# Patient Record
Sex: MALE | Race: BLACK OR AFRICAN AMERICAN | URBAN - METROPOLITAN AREA
[De-identification: names, ages, dates, MRNs, and addresses within clinical notes are randomized per-mention and may not be internally consistent; named-entity substitution may affect disease eponyms.]

---

## 2017-05-10 ENCOUNTER — EMERGENCY (EMERGENCY)
Facility: HOSPITAL | Age: 72
LOS: 0 days | Discharge: HOME | End: 2017-05-10
Attending: EMERGENCY MEDICINE
Payer: COMMERCIAL

## 2017-05-10 VITALS
RESPIRATION RATE: 16 BRPM | OXYGEN SATURATION: 100 % | WEIGHT: 139.99 LBS | SYSTOLIC BLOOD PRESSURE: 155 MMHG | TEMPERATURE: 98 F | DIASTOLIC BLOOD PRESSURE: 86 MMHG | HEART RATE: 66 BPM | HEIGHT: 67 IN

## 2017-05-10 DIAGNOSIS — I10 ESSENTIAL (PRIMARY) HYPERTENSION: ICD-10-CM

## 2017-05-10 DIAGNOSIS — R07.9 CHEST PAIN, UNSPECIFIED: ICD-10-CM
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
## 2017-05-10 DIAGNOSIS — S29.019A STRAIN OF MUSCLE AND TENDON OF UNSPECIFIED WALL OF THORAX, INITIAL ENCOUNTER: ICD-10-CM
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
## 2017-05-10 PROCEDURE — 99283 EMERGENCY DEPT VISIT LOW MDM: CPT

## 2017-05-10 NOTE — ED PROVIDER NOTE - MUSCULOSKELETAL, MLM
Spine appears normal, range of motion is not limited, no muscle or joint tenderness, left lateral chest wall mild tenderness
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
## 2021-06-07 PROBLEM — I10 ESSENTIAL (PRIMARY) HYPERTENSION: Chronic | Status: ACTIVE | Noted: 2017-05-10

## 2021-06-11 ENCOUNTER — NON-APPOINTMENT (OUTPATIENT)
Age: 76
End: 2021-06-11

## 2021-06-11 ENCOUNTER — APPOINTMENT (OUTPATIENT)
Dept: GERIATRICS | Facility: CLINIC | Age: 76
End: 2021-06-11
Payer: MEDICARE

## 2021-06-11 VITALS
WEIGHT: 165.38 LBS | TEMPERATURE: 97.7 F | BODY MASS INDEX: 25.96 KG/M2 | HEIGHT: 67 IN | HEART RATE: 61 BPM | SYSTOLIC BLOOD PRESSURE: 130 MMHG | OXYGEN SATURATION: 98 % | DIASTOLIC BLOOD PRESSURE: 72 MMHG | RESPIRATION RATE: 16 BRPM

## 2021-06-11 DIAGNOSIS — Z60.2 PROBLEMS RELATED TO LIVING ALONE: ICD-10-CM

## 2021-06-11 DIAGNOSIS — Z87.891 PERSONAL HISTORY OF NICOTINE DEPENDENCE: ICD-10-CM

## 2021-06-11 DIAGNOSIS — Z85.46 PERSONAL HISTORY OF MALIGNANT NEOPLASM OF PROSTATE: ICD-10-CM

## 2021-06-11 DIAGNOSIS — Z81.8 FAMILY HISTORY OF OTHER MENTAL AND BEHAVIORAL DISORDERS: ICD-10-CM

## 2021-06-11 DIAGNOSIS — Z00.00 ENCOUNTER FOR GENERAL ADULT MEDICAL EXAMINATION W/OUT ABNORMAL FINDINGS: ICD-10-CM

## 2021-06-11 DIAGNOSIS — Z78.9 OTHER SPECIFIED HEALTH STATUS: ICD-10-CM

## 2021-06-11 DIAGNOSIS — Z87.19 PERSONAL HISTORY OF OTHER DISEASES OF THE DIGESTIVE SYSTEM: ICD-10-CM

## 2021-06-11 DIAGNOSIS — N18.31 CHRONIC KIDNEY DISEASE, STAGE 3A: ICD-10-CM

## 2021-06-11 PROCEDURE — 99205 OFFICE O/P NEW HI 60 MIN: CPT

## 2021-06-11 RX ADMIN — ASPIRIN 0 MG: 81 TABLET, COATED ORAL at 00:00

## 2021-06-11 RX ADMIN — SIMVASTATIN 0 MG: 20 TABLET, FILM COATED ORAL at 00:00

## 2021-06-11 RX ADMIN — BISOPROLOL FUMARATE AND HYDROCHLOROTHIAZIDE 0 MG: 5; 6.25 TABLET, FILM COATED ORAL at 00:00

## 2021-06-11 RX ADMIN — AMLODIPINE BESYLATE 0 MG: 10 TABLET ORAL at 00:00

## 2021-06-11 SDOH — SOCIAL STABILITY - SOCIAL INSECURITY: PROBLEMS RELATED TO LIVING ALONE: Z60.2

## 2021-06-11 NOTE — HISTORY OF PRESENT ILLNESS
[No falls in past year] : Patient reported no falls in the past year [Fully functional (bathing, dressing, toileting, transferring, walking, feeding)] : Fully functional (bathing, dressing, toileting, transferring, walking, feeding) [Smoke Detector] : smoke detector [Carbon Monoxide Detector] : carbon monoxide detector [Seat Belt] :  uses seat belt [Driving] : driving [0] : 1) Little interest or pleasure doing things: Not at all [1] : 2) Feeling down, depressed, or hopeless for several days [Designated Healthcare Proxy] : Designated healthcare proxy [Name: ___] : Health Care Proxy's Name: [unfilled]  [Relationship: ___] : Relationship: [unfilled] [Guns at Home] : no guns at home [de-identified] : Requires some assistance with finances which is done by his son.  patient uses pill box and able to manage medications.   [FreeTextEntry1] : Depressed mood attributed to missing his wife.

## 2021-06-11 NOTE — PHYSICAL EXAM
[General Appearance - Alert] : alert [General Appearance - In No Acute Distress] : in no acute distress [General Appearance - Well Nourished] : well nourished [General Appearance - Well Developed] : well developed [General Appearance - Well-Appearing] : healthy appearing [Sclera] : the sclera and conjunctiva were normal [PERRL With Normal Accommodation] : pupils were equal in size, round, and reactive to light [Extraocular Movements] : extraocular movements were intact [Normal Oral Mucosa] : normal oral mucosa [No Oral Pallor] : no oral pallor [Neck Appearance] : the appearance of the neck was normal [Neck Cervical Mass (___cm)] : no neck mass was observed [Jugular Venous Distention Increased] : there was no jugular-venous distention [Thyroid Nodule] : there were no palpable thyroid nodules [Respiration, Rhythm And Depth] : normal respiratory rhythm and effort [Exaggerated Use Of Accessory Muscles For Inspiration] : no accessory muscle use [Auscultation Breath Sounds / Voice Sounds] : lungs were clear to auscultation bilaterally [Heart Rate And Rhythm] : heart rate was normal and rhythm regular [Heart Sounds] : normal S1 and S2 [Murmurs] : no murmurs [Edema] : there was no peripheral edema [Veins - Varicosity Changes] : there were no varicosital changes [Bowel Sounds] : normal bowel sounds [Abdomen Soft] : soft [Abdomen Tenderness] : non-tender [] : no hepato-splenomegaly [No CVA Tenderness] : no ~M costovertebral angle tenderness [No Spinal Tenderness] : no spinal tenderness [Abnormal Walk] : normal gait [Musculoskeletal - Swelling] : no joint swelling seen [Skin Color & Pigmentation] : normal skin color and pigmentation [Skin Turgor] : normal skin turgor [No Focal Deficits] : no focal deficits [Oriented To Time, Place, And Person] : oriented to person, place, and time [Affect] : the affect was normal [Mood] : the mood was normal [MentalStatusTotal] : 18

## 2021-06-11 NOTE — CURRENT MEDS
[Medication Reconciliation Completed] : Medication reconciliation completed [Yes] : Reviewed medication list for presence of high-risk medications.

## 2021-06-11 NOTE — REASON FOR VISIT
[Initial Evaluation] : an initial evaluation [Family Member] : family member [FreeTextEntry1] : forgetfulness

## 2021-06-11 NOTE — END OF VISIT
[] : Fellow [FreeTextEntry3] : Patient seen and examined with Dr. Mathis. Agree with assessment and plan. Need to r/o other causes of memory decline exacerbation. It is noted that memory loss was always there. Need to r/o stroke, mets. Will f/u with MOCA test on next visit and discuss risk benefit of medications. \par  [Time Spent: ___ minutes] : I have spent [unfilled] minutes of time on the encounter.

## 2021-06-18 ENCOUNTER — APPOINTMENT (OUTPATIENT)
Dept: CT IMAGING | Facility: IMAGING CENTER | Age: 76
End: 2021-06-18
Payer: MEDICARE

## 2021-06-18 ENCOUNTER — OUTPATIENT (OUTPATIENT)
Dept: OUTPATIENT SERVICES | Facility: HOSPITAL | Age: 76
LOS: 1 days | End: 2021-06-18
Payer: MEDICARE

## 2021-06-18 ENCOUNTER — RESULT REVIEW (OUTPATIENT)
Age: 76
End: 2021-06-18

## 2021-06-18 DIAGNOSIS — F03.90 UNSPECIFIED DEMENTIA WITHOUT BEHAVIORAL DISTURBANCE: ICD-10-CM

## 2021-06-18 PROCEDURE — 70450 CT HEAD/BRAIN W/O DYE: CPT | Mod: 26

## 2021-06-18 PROCEDURE — 70450 CT HEAD/BRAIN W/O DYE: CPT

## 2021-07-28 ENCOUNTER — TRANSCRIPTION ENCOUNTER (OUTPATIENT)
Age: 76
End: 2021-07-28

## 2021-08-20 ENCOUNTER — APPOINTMENT (OUTPATIENT)
Dept: GERIATRICS | Facility: CLINIC | Age: 76
End: 2021-08-20
Payer: MEDICARE

## 2021-08-20 VITALS
WEIGHT: 162.38 LBS | SYSTOLIC BLOOD PRESSURE: 120 MMHG | HEIGHT: 67 IN | DIASTOLIC BLOOD PRESSURE: 70 MMHG | OXYGEN SATURATION: 97 % | RESPIRATION RATE: 14 BRPM | HEART RATE: 65 BPM | BODY MASS INDEX: 25.48 KG/M2

## 2021-08-20 DIAGNOSIS — Z71.89 OTHER SPECIFIED COUNSELING: ICD-10-CM

## 2021-08-20 PROCEDURE — 99483 ASSMT & CARE PLN PT COG IMP: CPT

## 2021-08-20 RX ORDER — AMLODIPINE BESYLATE 10 MG/1
10 TABLET ORAL
Qty: 90 | Refills: 2 | Status: ACTIVE | COMMUNITY
Start: 2021-06-11

## 2021-08-20 RX ORDER — BISOPROLOL FUMARATE AND HYDROCHLOROTHIAZIDE 5; 6.25 MG/1; MG/1
5-6.25 TABLET, FILM COATED ORAL DAILY
Qty: 90 | Refills: 0 | Status: ACTIVE | COMMUNITY
Start: 2021-06-11

## 2021-08-20 RX ORDER — SIMVASTATIN 20 MG/1
20 TABLET, FILM COATED ORAL
Qty: 90 | Refills: 3 | Status: ACTIVE | COMMUNITY
Start: 2021-06-11

## 2021-08-31 PROBLEM — Z71.89 ADVANCED CARE PLANNING/COUNSELING DISCUSSION: Status: ACTIVE | Noted: 2021-06-11

## 2021-08-31 NOTE — HISTORY OF PRESENT ILLNESS
[Patient is independent with] : bathing [Independent] : managing finances [1] : 2) Feeling down, depressed, or hopeless for several days (1) [Several Days (1)] : 7.) Trouble concentrating on things, such as reading a newspaper or watching television? Several days [Not at All (0)] : 8.) Moving or speaking so slowly that other people could have noticed, or the opposite, moving or speaking faster than usual? Not at all [Mild] : severity of depression is mild [Not at all] : How difficult have these problems made it for you to do your work, take care of things at home, or get along with people? 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 Accompanied by Son Nnamdi. He has known medical hx of HTN, DM, HLD, hx of prostate ca s/p prostatectomy ( 2000), quit smoking - 1986 . Patient was referred here for consultation on forgetfulness by PCP. He presents today for cognition evaluation. \par \par According to son, this has been slowly progressive over the past 1-2 years, aggravated after demise of patients wife during covid lock down. He has been noted to forget recent conversations, repeating same questions, forgetting to pay some bills. He lost wife in May 2020, has 7 kids, 13 grandchildren. He endorses period of sadness attributed to missing his wife but he still enjoys things like watching TV, he loves to travel around like he has always done with his wife but has not been able to because of covid . He had been active with [employer] activities prior to covid. lives by himself - has a nephew living in his basement.\par No acute complaints today. \par \par PCP - Maurizio Silva\par  \par Blood work last week; CBC, PSA, electrolytes WNL, - A1C - 7.1 , Cr - 1.2 , GFR - 54 \par Completed Covid vaccine series \par Will obtain records from PCP and fax here\par HCP signed today designating Son \par \par Bereavement- His wife passed 1 year ago suddenly\par Son stated there were memory concerns prior to his wife passing away\par He has been warming up to returning to [employer], and does participate\par Continues to drive, no accidents or incidents noted \par \par  [Smoke Detector] : no smoke detector [Carbon Monoxide Detector] : no carbon monoxide detector [Guns at Home] : no guns at home [Grab Bars] : no grab bars [Shower Chair] : no shower chair [Anti-Slip Measures] : no anti-slip measures [Driving Concerns] : not driving or driving without noted concerns [Wears Seat Belt] : does not wear seat belt [Wears Sunscreen] : does not wear sunscreen [[id number]] : 2 [[id number]] : 5 [AdvancecareDate] : 08/20/21

## 2021-09-16 ENCOUNTER — TRANSCRIPTION ENCOUNTER (OUTPATIENT)
Age: 76
End: 2021-09-16

## 2021-10-01 ENCOUNTER — APPOINTMENT (OUTPATIENT)
Dept: GERIATRICS | Facility: CLINIC | Age: 76
End: 2021-10-01
Payer: MEDICARE

## 2021-10-01 VITALS
SYSTOLIC BLOOD PRESSURE: 122 MMHG | HEART RATE: 57 BPM | HEIGHT: 67 IN | BODY MASS INDEX: 25.29 KG/M2 | WEIGHT: 161.13 LBS | RESPIRATION RATE: 16 BRPM | OXYGEN SATURATION: 99 % | TEMPERATURE: 97.3 F | DIASTOLIC BLOOD PRESSURE: 64 MMHG

## 2021-10-01 PROCEDURE — 99215 OFFICE O/P EST HI 40 MIN: CPT | Mod: GC

## 2021-10-01 NOTE — END OF VISIT
[Time Spent: ___ minutes] : I have spent [unfilled] minutes of time on the encounter. [] : Fellow [FreeTextEntry4] : Pt will return to clinic in one month [FreeTextEntry3] : Agree with Dr Geller's assessment and plan. Patient would still like alternative methods of group therapy before initiating lexapro. No SI.

## 2021-10-01 NOTE — HISTORY OF PRESENT ILLNESS
[PHQ-2 Positive] : PHQ-2 Positive [PHQ-9 Positive] : PHQ-9 Positive [Moderate] : Stage: Moderate [Stable] : Status: Stable [FreeTextEntry1] : Mr Coto is a 75 yo [facility] Male, former smoker (quit 1986) w/ PMH Of dementia, mild depression HTN, DM, HLD, Hx of prostate Cancer s/p prostatectomy (2000) p/w accompanied with Son for f/u visit for forgetfulness and depression. \par \par He reports that since the last visit he has been walking more. He is gardening. Feels that he slowly is becoming more active and engaging more with people. Mr. Coto admits that at times he starts to think about his wife who passed one year ago and thoughts trigger sadness. \par He denies having thoughts of wanting to hurt himself or having a plan to hurt himself. \par \par Mr. Coto has 3 grand kids and they keep him occupied. He takes kids to school in the morning and picks them up after school. \par He is able to drive and has not had any accidents. Reports no recent falls, episodes of getting lost. Mr. Coto lives alone. he cooks, cleans and does laundry. Walks with no devices. He enjoys walks. ALso helps neighbors with  type of tasks. \par \par PHQ during today visit was 5. \par \par Mr. Coto wants to continue on working to get more active and see how this helps with managing depression. \par \par Pt and son are aware that improving mood will optimize memory.\par \par \par

## 2022-02-25 ENCOUNTER — LABORATORY RESULT (OUTPATIENT)
Age: 77
End: 2022-02-25

## 2022-02-25 ENCOUNTER — APPOINTMENT (OUTPATIENT)
Dept: GERIATRICS | Facility: CLINIC | Age: 77
End: 2022-02-25
Payer: MEDICARE

## 2022-02-25 VITALS
TEMPERATURE: 98.3 F | RESPIRATION RATE: 15 BRPM | WEIGHT: 164 LBS | BODY MASS INDEX: 25.69 KG/M2 | DIASTOLIC BLOOD PRESSURE: 60 MMHG | HEART RATE: 59 BPM | SYSTOLIC BLOOD PRESSURE: 136 MMHG | OXYGEN SATURATION: 99 %

## 2022-02-25 DIAGNOSIS — I10 ESSENTIAL (PRIMARY) HYPERTENSION: ICD-10-CM

## 2022-02-25 DIAGNOSIS — Z13.31 ENCOUNTER FOR SCREENING FOR DEPRESSION: ICD-10-CM

## 2022-02-25 DIAGNOSIS — E55.9 VITAMIN D DEFICIENCY, UNSPECIFIED: ICD-10-CM

## 2022-02-25 DIAGNOSIS — D64.9 ANEMIA, UNSPECIFIED: ICD-10-CM

## 2022-02-25 DIAGNOSIS — E78.5 HYPERLIPIDEMIA, UNSPECIFIED: ICD-10-CM

## 2022-02-25 PROCEDURE — 99215 OFFICE O/P EST HI 40 MIN: CPT

## 2022-03-01 LAB
25(OH)D3 SERPL-MCNC: 43.4 NG/ML
CHOLEST SERPL-MCNC: 153 MG/DL
ESTIMATED AVERAGE GLUCOSE: 148 MG/DL
FOLATE SERPL-MCNC: 10.8 NG/ML
HBA1C MFR BLD HPLC: 6.8 %
HDLC SERPL-MCNC: 42 MG/DL
LDLC SERPL CALC-MCNC: 88 MG/DL
NONHDLC SERPL-MCNC: 110 MG/DL
TRIGL SERPL-MCNC: 112 MG/DL
TSH SERPL-ACNC: 2.37 UIU/ML
VIT B12 SERPL-MCNC: 384 PG/ML

## 2022-03-02 PROBLEM — E55.9 VITAMIN D INSUFFICIENCY: Status: ACTIVE | Noted: 2022-02-25

## 2022-03-02 PROBLEM — I10 HTN (HYPERTENSION): Status: ACTIVE | Noted: 2021-06-11

## 2022-03-02 PROBLEM — D64.9 ANEMIA: Status: ACTIVE | Noted: 2022-02-25

## 2022-03-02 PROBLEM — Z13.31 POSITIVE DEPRESSION SCREENING: Status: ACTIVE | Noted: 2021-08-31

## 2022-03-02 PROBLEM — E78.5 HLD (HYPERLIPIDEMIA): Status: ACTIVE | Noted: 2021-06-11

## 2022-03-02 RX ORDER — GLIPIZIDE 2.5 MG/1
2.5 TABLET, FILM COATED, EXTENDED RELEASE ORAL
Qty: 90 | Refills: 3 | Status: ACTIVE | COMMUNITY
Start: 2022-03-02

## 2022-03-02 NOTE — REVIEW OF SYSTEMS
[As Noted in HPI] : as noted in HPI [Confused] : confusion [Depression] : depression [Emotional Problems] : emotional problems [Negative] : Heme/Lymph

## 2022-03-02 NOTE — PHYSICAL EXAM
[Alert] : alert [No Acute Distress] : in no acute distress [Sclera] : the sclera and conjunctiva were normal [Normal Outer Ear/Nose] : the ears and nose were normal in appearance [Normal Appearance] : the appearance of the neck was normal [No Respiratory Distress] : no respiratory distress [Normal S1, S2] : normal S1 and S2 [Edema] : edema was not present [Bowel Sounds] : normal bowel sounds [Abdomen Soft] : soft [No Spinal Tenderness] : no spinal tenderness [Normal Gait] : normal gait [Involuntary Movements] : no involuntary movements were seen [Normal Color / Pigmentation] : normal skin color and pigmentation [No Focal Deficits] : no focal deficits [Normal Insight/Judgment] : insight and judgment were intact [MocaTotal] : 19 [FreeTextEntry1] : 2021 [MentalStatusTotal] : done 8/2021

## 2022-03-02 NOTE — END OF VISIT
[] : Fellow [Time Spent: ___ minutes] : I have spent [unfilled] minutes of time on the encounter. [FreeTextEntry4] : Pt will return to clinic in one month [FreeTextEntry3] : Agree with Dr Geller's assessment and plan. Patient would still like alternative methods of group therapy before initiating lexapro. No SI.

## 2022-03-02 NOTE — HISTORY OF PRESENT ILLNESS
[One fall no injury in past year] : Patient reported one fall in the past year without injury [Completely Independent] : Completely independent. 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 \par \par Patient reports he is doing well\par Son reports he is still tearful and remains isolated. \par \par (+) gardening, playing dominos, helping with grandchildren, spiritual\par (-) has accommodations for [employer] which is the best social outlet for him w/ arranged rides with his sister in law however refuses to go due to differences with his  and possibly avoidance of the "how are you doing without your wife" scenario. \par (-) does not go out unless absolutely needed \par (-) easily tearful about wife 2 years later \par (-) decreased appetite\par \par #DEPRESSION\par Last PHQ during visit was 5\par patient prepared to start medication, son at Visit in support\par denies SI, has a support system, smiles with certain discussions \par \par \par  [GDS] : 5 done on 10/1/2021

## 2022-03-02 NOTE — CURRENT MEDS
[Yes] : Reviewed medication list for presence of high-risk medications. [] : does not miss any medication doses

## 2022-03-22 ENCOUNTER — APPOINTMENT (OUTPATIENT)
Dept: GERIATRICS | Facility: CLINIC | Age: 77
End: 2022-03-22
Payer: MEDICARE

## 2022-03-22 PROCEDURE — 99442: CPT

## 2022-04-08 ENCOUNTER — APPOINTMENT (OUTPATIENT)
Dept: GERIATRICS | Facility: CLINIC | Age: 77
End: 2022-04-08
Payer: MEDICARE

## 2022-04-08 VITALS
BODY MASS INDEX: 25.62 KG/M2 | WEIGHT: 163.25 LBS | TEMPERATURE: 97.8 F | HEIGHT: 67 IN | DIASTOLIC BLOOD PRESSURE: 60 MMHG | OXYGEN SATURATION: 96 % | HEART RATE: 62 BPM | SYSTOLIC BLOOD PRESSURE: 122 MMHG | RESPIRATION RATE: 16 BRPM

## 2022-04-08 DIAGNOSIS — Z63.4 DISAPPEARANCE AND DEATH OF FAMILY MEMBER: ICD-10-CM

## 2022-04-08 DIAGNOSIS — E11.9 TYPE 2 DIABETES MELLITUS W/OUT COMPLICATIONS: ICD-10-CM

## 2022-04-08 PROCEDURE — 99215 OFFICE O/P EST HI 40 MIN: CPT

## 2022-04-08 RX ORDER — ESCITALOPRAM OXALATE 5 MG/1
5 TABLET ORAL DAILY
Qty: 30 | Refills: 1 | Status: DISCONTINUED | COMMUNITY
Start: 2022-03-01 | End: 2022-04-08

## 2022-04-08 SDOH — SOCIAL STABILITY - SOCIAL INSECURITY: DISSAPEARANCE AND DEATH OF FAMILY MEMBER: Z63.4
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
## 2022-04-08 NOTE — REVIEW OF SYSTEMS
[Confused] : confusion [As Noted in HPI] : as noted in HPI [Depression] : depression [Emotional Problems] : emotional problems [Negative] : Heme/Lymph

## 2022-04-08 NOTE — PHYSICAL EXAM
[Alert] : alert [Sclera] : the sclera and conjunctiva were normal [Normal Outer Ear/Nose] : the ears and nose were normal in appearance [Normal Appearance] : the appearance of the neck was normal [No Respiratory Distress] : no respiratory distress [Normal S1, S2] : normal S1 and S2 [Edema] : edema was not present [Bowel Sounds] : normal bowel sounds [Abdomen Soft] : soft [No Spinal Tenderness] : no spinal tenderness [Normal Gait] : normal gait [Involuntary Movements] : no involuntary movements were seen [Normal Color / Pigmentation] : normal skin color and pigmentation [No Focal Deficits] : no focal deficits [Normal Insight/Judgment] : insight and judgment were intact [MocaTotal] : 19 [FreeTextEntry1] : 2021 [MentalStatusTotal] : done 8/2021

## 2022-04-29 ENCOUNTER — APPOINTMENT (OUTPATIENT)
Dept: GERIATRICS | Facility: CLINIC | Age: 77
End: 2022-04-29
Payer: MEDICARE

## 2022-04-29 VITALS
SYSTOLIC BLOOD PRESSURE: 108 MMHG | OXYGEN SATURATION: 97 % | WEIGHT: 162 LBS | RESPIRATION RATE: 16 BRPM | BODY MASS INDEX: 25.37 KG/M2 | TEMPERATURE: 98 F | HEART RATE: 69 BPM | DIASTOLIC BLOOD PRESSURE: 56 MMHG

## 2022-04-29 DIAGNOSIS — F41.9 ANXIETY DISORDER, UNSPECIFIED: ICD-10-CM

## 2022-04-29 DIAGNOSIS — F32.A ANXIETY DISORDER, UNSPECIFIED: ICD-10-CM

## 2022-04-29 DIAGNOSIS — E87.1 HYPO-OSMOLALITY AND HYPONATREMIA: ICD-10-CM

## 2022-04-29 DIAGNOSIS — F03.90 UNSPECIFIED DEMENTIA W/OUT BEHAVIORAL DISTURBANCE: ICD-10-CM

## 2022-04-29 PROCEDURE — 99214 OFFICE O/P EST MOD 30 MIN: CPT

## 2022-04-29 NOTE — HISTORY OF PRESENT ILLNESS
[One fall no injury in past year] : Patient reported one fall in the past year without injury [Completely Independent] : Completely independent. 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 [Wears Seat Belt] : wears seat belt [PHQ-2 Positive] : PHQ-2 Positive [PHQ-9 Positive] : PHQ-9 Positive [Moderate] : Stage: Moderate [Stable] : Status: Stable [Patient/Caregiver not ready to engage] : , patient/caregiver not ready to engage [FreeTextEntry1] : Mr Coto is a 77 yo [place] Male, former smoker (quit 1986) w/ PMH Of dementia, mild depression HTN, DM, HLD, Hx of prostate Cancer s/p prostatectomy (2000) p/w accompanied with Son for f/u visit for forgetfulness and depression s/p inc in lexapro to 10 mg. \par \par Increased medication of lexapro to 10 mg prior to trip to [facility]. \par 2 weeks into dose adjustment. Missed few dosed on trip but now son is monitoring his medications. \par Admitted patient was forgetful with his phone and had episodes of disorientation on the trip itself which brought a sense of reality as to the progression of patients dementia. \par Patient no longer drives the children as per the son. \par \par (+) gardening, playing dominos, helping with grandchildren, spiritual, cooks\par (-) has accommodations for [employer] which is the best social outlet for him w/ arranged rides with his sister in law, going on [employer] trip with supervision to [place] in 1 week. \par (-) remains easily tearful about wife 2 years later \par (-) decreased appetite, missing meals- weight stable \par \par #DEPRESSION\par Currently on lexapro 10 mg \par reports compliance and willingness to increase his medications\par denies SI, has a support system, smiles with certain discussions \par \par \par  [GDS] : 5 done on 10/1/2021

## 2022-05-03 LAB
ANION GAP SERPL CALC-SCNC: 13 MMOL/L
BUN SERPL-MCNC: 13 MG/DL
CALCIUM SERPL-MCNC: 10.2 MG/DL
CHLORIDE SERPL-SCNC: 102 MMOL/L
CO2 SERPL-SCNC: 26 MMOL/L
CREAT SERPL-MCNC: 1.19 MG/DL
EGFR: 63 ML/MIN/1.73M2
GLUCOSE SERPL-MCNC: 78 MG/DL
POTASSIUM SERPL-SCNC: 4.5 MMOL/L
SODIUM SERPL-SCNC: 140 MMOL/L

## 2022-05-31 ENCOUNTER — TRANSCRIPTION ENCOUNTER (OUTPATIENT)
Age: 77
End: 2022-05-31

## 2022-10-28 ENCOUNTER — RX RENEWAL (OUTPATIENT)
Age: 77
End: 2022-10-28

## 2022-12-21 RX ORDER — ESCITALOPRAM OXALATE 10 MG/1
10 TABLET ORAL
Qty: 30 | Refills: 3 | Status: ACTIVE | COMMUNITY
Start: 2022-04-08 | End: 1900-01-01